# Patient Record
Sex: FEMALE | Race: WHITE | Employment: OTHER | URBAN - METROPOLITAN AREA
[De-identification: names, ages, dates, MRNs, and addresses within clinical notes are randomized per-mention and may not be internally consistent; named-entity substitution may affect disease eponyms.]

---

## 2021-10-02 ENCOUNTER — HOSPITAL ENCOUNTER (OUTPATIENT)
Age: 76
Discharge: HOME | End: 2021-10-02
Payer: MEDICARE

## 2021-10-02 ENCOUNTER — APPOINTMENT (OUTPATIENT)
Dept: GENERAL RADIOLOGY | Age: 76
End: 2021-10-02
Attending: PHYSICIAN ASSISTANT
Payer: MEDICARE

## 2021-10-02 VITALS
BODY MASS INDEX: 25.49 KG/M2 | HEART RATE: 66 BPM | DIASTOLIC BLOOD PRESSURE: 44 MMHG | SYSTOLIC BLOOD PRESSURE: 153 MMHG | WEIGHT: 135 LBS | RESPIRATION RATE: 20 BRPM | HEIGHT: 61 IN | TEMPERATURE: 98 F | OXYGEN SATURATION: 99 %

## 2021-10-02 DIAGNOSIS — J45.21 MILD INTERMITTENT ASTHMA WITH EXACERBATION: Primary | ICD-10-CM

## 2021-10-02 PROCEDURE — 94640 AIRWAY INHALATION TREATMENT: CPT | Performed by: PHYSICIAN ASSISTANT

## 2021-10-02 PROCEDURE — 99214 OFFICE O/P EST MOD 30 MIN: CPT | Performed by: PHYSICIAN ASSISTANT

## 2021-10-02 PROCEDURE — 71046 X-RAY EXAM CHEST 2 VIEWS: CPT | Performed by: PHYSICIAN ASSISTANT

## 2021-10-02 PROCEDURE — U0002 COVID-19 LAB TEST NON-CDC: HCPCS | Performed by: PHYSICIAN ASSISTANT

## 2021-10-02 RX ORDER — PREDNISONE 20 MG/1
60 TABLET ORAL ONCE
Status: COMPLETED | OUTPATIENT
Start: 2021-10-02 | End: 2021-10-02

## 2021-10-02 RX ORDER — PREDNISONE 20 MG/1
60 TABLET ORAL DAILY
Qty: 12 TABLET | Refills: 0 | Status: SHIPPED | OUTPATIENT
Start: 2021-10-03 | End: 2021-10-07

## 2021-10-02 RX ORDER — ALBUTEROL SULFATE 90 UG/1
AEROSOL, METERED RESPIRATORY (INHALATION) EVERY 6 HOURS PRN
COMMUNITY

## 2021-10-02 RX ORDER — PREDNISONE 20 MG/1
60 TABLET ORAL DAILY
Qty: 12 TABLET | Refills: 0 | Status: SHIPPED | OUTPATIENT
Start: 2021-10-03 | End: 2021-10-02

## 2021-10-02 RX ORDER — ALBUTEROL SULFATE 90 UG/1
8 AEROSOL, METERED RESPIRATORY (INHALATION) ONCE
Status: COMPLETED | OUTPATIENT
Start: 2021-10-02 | End: 2021-10-02

## 2021-10-02 RX ORDER — ASPIRIN 81 MG/1
TABLET, CHEWABLE ORAL DAILY
COMMUNITY

## 2021-10-02 NOTE — ED PROVIDER NOTES
Patient Seen in: Immediate [facility]      History   Patient presents with:  Difficulty Breathing    Stated Complaint: shortness of breath    Subjective:   HPI    73-year-old female with history of asthma presents to the IC for evaluation of shortness o back: Normal range of motion and neck supple. Skin:     General: Skin is warm and dry. Capillary Refill: Capillary refill takes less than 2 seconds. Neurological:      Mental Status: She is alert.              ED Course     Labs Reviewed   RAPID SA was discussed with my supervising physician, Dr. Adam Garcia via  telephone. We discussed the clinical presentation, my exam, testing methodology and agreed on plan of care.                          Disposition and Plan     Clinical Impression:  Mild intermit Improved

## 2021-10-02 NOTE — ED INITIAL ASSESSMENT (HPI)
Pt sts 4 days of chest tightness/SOB. Denies cold symptoms. 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

## 2022-04-05 LAB
*MEAN CORPUSCULAR HGB CONC: 30.6 G/DL (ref 32.5–35.8)
BASOPHIL AUTOMATED: 1.3 %
BASOPHILS: 0.1 (ref 0–0.14)
EOSINOPHIL AUTOMATED: 2.1 %
EOSINOPHILS: 0.2 (ref 0–0.6)
HEMATOCRIT: 25.4 % (ref 34.7–45.1)
HEMOGLOBIN: 7.8 GM/DL (ref 12–15.3)
LYMPHOCYTE AUTOMATED: 19.1 %
LYMPHOCYTES: 1.6 (ref 0.78–3.73)
MEAN CORPUSCULAR HGB: 19.4 PG (ref 26–34)
MEAN CORPUSCULAR VOL: 63.4 FL (ref 80–100)
MEAN PLATELET VOLUME: 8.3 FL (ref 6.8–10.2)
MONOCYTE AUTOMATED: 11.4 %
MONOCYTES: 1 (ref 0.17–1)
NEUTROPHIL ABSOLUTE: 5.5 (ref 1.91–7.6)
NEUTROPHIL AUTOMATED: 66.1 %
PLATELET COUNT: 326 K/MM3 (ref 150–450)
RED BLOOD CELL COUNT: 4 M/MM3 (ref 3.63–5.04)
RED CELL DISTRIBUTIO: 32.7 % (ref 11.9–15.9)
WHITE BLOOD CELL COU: 8.4 K/MM3 (ref 4–11)

## 2023-08-23 ENCOUNTER — HOSPITAL ENCOUNTER (OUTPATIENT)
Age: 78
Discharge: ANOTHER HOSPITAL | End: 2023-08-23
Payer: MEDICARE

## 2023-08-23 VITALS
OXYGEN SATURATION: 97 % | DIASTOLIC BLOOD PRESSURE: 81 MMHG | HEART RATE: 150 BPM | RESPIRATION RATE: 20 BRPM | SYSTOLIC BLOOD PRESSURE: 173 MMHG | TEMPERATURE: 99 F

## 2023-08-23 DIAGNOSIS — S81.801A OPEN WOUND OF RIGHT LOWER LEG, INITIAL ENCOUNTER: ICD-10-CM

## 2023-08-23 DIAGNOSIS — I48.91 ATRIAL FIBRILLATION WITH RVR (HCC): Primary | ICD-10-CM

## 2023-08-23 LAB
Q-T INTERVAL: 318 MS
QRS DURATION: 86 MS
QTC CALCULATION (BEZET): 493 MS
R AXIS: 10 DEGREES
T AXIS: 113 DEGREES
VENTRICULAR RATE: 145 BPM

## 2023-08-23 PROCEDURE — 99214 OFFICE O/P EST MOD 30 MIN: CPT | Performed by: NURSE PRACTITIONER

## 2023-08-23 PROCEDURE — 93000 ELECTROCARDIOGRAM COMPLETE: CPT | Performed by: NURSE PRACTITIONER

## 2023-08-23 RX ORDER — ATORVASTATIN CALCIUM 40 MG/1
TABLET, FILM COATED ORAL
COMMUNITY
Start: 2023-06-09

## 2023-08-23 RX ORDER — ACETAMINOPHEN 500 MG
1000 TABLET ORAL ONCE
Status: DISCONTINUED | OUTPATIENT
Start: 2023-08-23 | End: 2023-08-23

## 2023-08-23 RX ORDER — CLOPIDOGREL BISULFATE 75 MG/1
75 TABLET ORAL DAILY
COMMUNITY
Start: 2023-04-25

## 2023-08-23 RX ORDER — DULOXETIN HYDROCHLORIDE 30 MG/1
30 CAPSULE, DELAYED RELEASE ORAL DAILY
COMMUNITY
Start: 2023-08-03

## 2023-08-23 RX ORDER — METOPROLOL SUCCINATE 100 MG/1
100 TABLET, EXTENDED RELEASE ORAL NIGHTLY
COMMUNITY
Start: 2023-06-09

## 2023-08-23 RX ORDER — FUROSEMIDE 20 MG/1
20 TABLET ORAL DAILY
COMMUNITY
Start: 2023-05-31

## 2023-08-23 RX ORDER — LISINOPRIL 40 MG/1
40 TABLET ORAL DAILY
COMMUNITY

## 2023-08-23 RX ORDER — MONTELUKAST SODIUM 10 MG/1
10 TABLET ORAL NIGHTLY
COMMUNITY

## 2023-08-23 NOTE — ED INITIAL ASSESSMENT (HPI)
Pt states she fell in the garden 2 weeks ago. Laceration to her right lower leg with redness. States she has been keeping it clean but it does not seem to be heeling. Yellow drainage.

## 2024-10-05 PROCEDURE — 93010 ELECTROCARDIOGRAM REPORT: CPT | Performed by: INTERNAL MEDICINE

## 2024-10-06 ENCOUNTER — EXTERNAL RECORD (OUTPATIENT)
Dept: OTHER | Age: 79
End: 2024-10-06

## 2024-10-07 ENCOUNTER — EXTERNAL RECORD (OUTPATIENT)
Dept: OTHER | Facility: PHYSICIAN GROUP | Age: 79
End: 2024-10-07

## 2024-10-07 PROCEDURE — 93306 TTE W/DOPPLER COMPLETE: CPT | Performed by: INTERNAL MEDICINE

## 2024-10-08 ENCOUNTER — OFF PREMISE (OUTPATIENT)
Dept: HEALTH INFORMATION MANAGEMENT | Facility: OTHER | Age: 79
End: 2024-10-08

## 2024-10-08 PROCEDURE — 93010 ELECTROCARDIOGRAM REPORT: CPT | Performed by: INTERNAL MEDICINE
